# Patient Record
Sex: MALE | Race: WHITE
[De-identification: names, ages, dates, MRNs, and addresses within clinical notes are randomized per-mention and may not be internally consistent; named-entity substitution may affect disease eponyms.]

---

## 2020-01-26 ENCOUNTER — HOSPITAL ENCOUNTER (EMERGENCY)
Dept: HOSPITAL 4 - SED | Age: 30
Discharge: HOME | End: 2020-01-26
Payer: COMMERCIAL

## 2020-01-26 VITALS — SYSTOLIC BLOOD PRESSURE: 123 MMHG

## 2020-01-26 VITALS — SYSTOLIC BLOOD PRESSURE: 120 MMHG

## 2020-01-26 VITALS — WEIGHT: 176 LBS | BODY MASS INDEX: 26.67 KG/M2 | HEIGHT: 68 IN

## 2020-01-26 DIAGNOSIS — F17.290: ICD-10-CM

## 2020-01-26 DIAGNOSIS — J06.9: Primary | ICD-10-CM

## 2020-01-26 DIAGNOSIS — Z71.6: ICD-10-CM

## 2020-01-26 NOTE — NUR
Patient given written and verbal discharge instructions and verbalizes 
understanding. ER MD Dr. Taylor discussed with patient the results and treatment 
provided. Patient in stable condition. ID arm band removed. No Rx given. 
Patient educated on pain management and to follow up with PMD. Pain Scale 4/10. 
Opportunity for questions provided and answered. Medication side effect fact 
sheet provided.

## 2020-01-26 NOTE — NUR
Patient AAO x 4 ambulates to ER bed 03 with complaints of cough, congestion, 
and 4/10 body aches x 2 weeks. Was seen at [facility]gent [facility] x 2 days ago and was 
prescribed Augmentin, Promethazine, and Tylenol. Even chest rise and fall with 
respirations. Will continue to monitor.